# Patient Record
Sex: MALE | Race: BLACK OR AFRICAN AMERICAN | NOT HISPANIC OR LATINO | Employment: UNEMPLOYED | URBAN - METROPOLITAN AREA
[De-identification: names, ages, dates, MRNs, and addresses within clinical notes are randomized per-mention and may not be internally consistent; named-entity substitution may affect disease eponyms.]

---

## 2022-11-16 DIAGNOSIS — M71.22 BAKER'S CYST, LEFT: Primary | ICD-10-CM

## 2022-11-17 ENCOUNTER — HOSPITAL ENCOUNTER (OUTPATIENT)
Dept: RADIOLOGY | Facility: HOSPITAL | Age: 57
Discharge: HOME | End: 2022-11-17
Attending: STUDENT IN AN ORGANIZED HEALTH CARE EDUCATION/TRAINING PROGRAM
Payer: MEDICAID

## 2022-11-17 ENCOUNTER — OFFICE VISIT (OUTPATIENT)
Dept: SPORTS MEDICINE | Facility: CLINIC | Age: 57
End: 2022-11-17
Payer: MEDICAID

## 2022-11-17 VITALS — HEIGHT: 71 IN | RESPIRATION RATE: 20 BRPM

## 2022-11-17 DIAGNOSIS — M25.462 EFFUSION OF LEFT KNEE JOINT: ICD-10-CM

## 2022-11-17 DIAGNOSIS — M71.22 BAKER'S CYST, LEFT: ICD-10-CM

## 2022-11-17 DIAGNOSIS — S83.412A TEAR OF MCL (MEDIAL COLLATERAL LIGAMENT) OF KNEE, LEFT, INITIAL ENCOUNTER: Primary | ICD-10-CM

## 2022-11-17 PROCEDURE — 99204 OFFICE O/P NEW MOD 45 MIN: CPT | Mod: 25,S$PBB,, | Performed by: STUDENT IN AN ORGANIZED HEALTH CARE EDUCATION/TRAINING PROGRAM

## 2022-11-17 PROCEDURE — 73590 X-RAY EXAM OF LOWER LEG: CPT | Mod: TC,LT

## 2022-11-17 PROCEDURE — 1159F MED LIST DOCD IN RCRD: CPT | Mod: CPTII,,, | Performed by: STUDENT IN AN ORGANIZED HEALTH CARE EDUCATION/TRAINING PROGRAM

## 2022-11-17 PROCEDURE — 73590 X-RAY EXAM OF LOWER LEG: CPT | Mod: 26,LT,, | Performed by: RADIOLOGY

## 2022-11-17 PROCEDURE — 20610 DRAIN/INJ JOINT/BURSA W/O US: CPT | Mod: PBBFAC | Performed by: STUDENT IN AN ORGANIZED HEALTH CARE EDUCATION/TRAINING PROGRAM

## 2022-11-17 PROCEDURE — 73562 XR KNEE ORTHO LEFT WITH FLEXION: ICD-10-PCS | Mod: 26,RT,, | Performed by: RADIOLOGY

## 2022-11-17 PROCEDURE — 99213 OFFICE O/P EST LOW 20 MIN: CPT | Mod: PBBFAC | Performed by: STUDENT IN AN ORGANIZED HEALTH CARE EDUCATION/TRAINING PROGRAM

## 2022-11-17 PROCEDURE — 97110 THERAPEUTIC EXERCISES: CPT | Mod: PBBFAC

## 2022-11-17 PROCEDURE — 99999 PR PBB SHADOW E&M-EST. PATIENT-LVL III: CPT | Mod: PBBFAC,,, | Performed by: STUDENT IN AN ORGANIZED HEALTH CARE EDUCATION/TRAINING PROGRAM

## 2022-11-17 PROCEDURE — 20610 LARGE JOINT ASPIRATION/INJECTION: L KNEE: ICD-10-PCS | Mod: S$PBB,LT,, | Performed by: STUDENT IN AN ORGANIZED HEALTH CARE EDUCATION/TRAINING PROGRAM

## 2022-11-17 PROCEDURE — 73562 X-RAY EXAM OF KNEE 3: CPT | Mod: 26,RT,, | Performed by: RADIOLOGY

## 2022-11-17 PROCEDURE — 99999 PR PBB SHADOW E&M-EST. PATIENT-LVL III: ICD-10-PCS | Mod: PBBFAC,,, | Performed by: STUDENT IN AN ORGANIZED HEALTH CARE EDUCATION/TRAINING PROGRAM

## 2022-11-17 PROCEDURE — 1159F PR MEDICATION LIST DOCUMENTED IN MEDICAL RECORD: ICD-10-PCS | Mod: CPTII,,, | Performed by: STUDENT IN AN ORGANIZED HEALTH CARE EDUCATION/TRAINING PROGRAM

## 2022-11-17 PROCEDURE — 97760 ORTHOTIC MGMT&TRAING 1ST ENC: CPT | Mod: GP,PBBFAC

## 2022-11-17 PROCEDURE — 99204 PR OFFICE/OUTPT VISIT, NEW, LEVL IV, 45-59 MIN: ICD-10-PCS | Mod: 25,S$PBB,, | Performed by: STUDENT IN AN ORGANIZED HEALTH CARE EDUCATION/TRAINING PROGRAM

## 2022-11-17 PROCEDURE — 73564 X-RAY EXAM KNEE 4 OR MORE: CPT | Mod: 26,LT,, | Performed by: RADIOLOGY

## 2022-11-17 PROCEDURE — 73564 X-RAY EXAM KNEE 4 OR MORE: CPT | Mod: TC,LT

## 2022-11-17 PROCEDURE — 73590 XR TIBIA FIBULA 2 VIEW LEFT: ICD-10-PCS | Mod: 26,LT,, | Performed by: RADIOLOGY

## 2022-11-17 PROCEDURE — 73564 XR KNEE ORTHO LEFT WITH FLEXION: ICD-10-PCS | Mod: 26,LT,, | Performed by: RADIOLOGY

## 2022-11-17 RX ORDER — MELOXICAM 15 MG/1
15 TABLET ORAL DAILY
Qty: 30 TABLET | Refills: 1 | Status: SHIPPED | OUTPATIENT
Start: 2022-11-17

## 2022-11-17 RX ORDER — LIDOCAINE HYDROCHLORIDE 10 MG/ML
5 INJECTION INFILTRATION; PERINEURAL
Status: DISCONTINUED | OUTPATIENT
Start: 2022-11-17 | End: 2022-11-17 | Stop reason: HOSPADM

## 2022-11-17 RX ADMIN — LIDOCAINE HYDROCHLORIDE 5 ML: 10 INJECTION, SOLUTION INFILTRATION; PERINEURAL at 08:11

## 2022-11-17 NOTE — PROCEDURES
Large Joint Aspiration/Injection: L knee    Date/Time: 11/17/2022 8:20 AM  Performed by: Mike Coker MD  Authorized by: Mike Coker MD     Consent Done?:  Yes (Verbal)  Indications:  Pain and joint swelling  Site marked: the procedure site was marked    Timeout: prior to procedure the correct patient, procedure, and site was verified    Prep: patient was prepped and draped in usual sterile fashion      Local anesthesia used?: Yes    Local anesthetic:  Topical anesthetic    Details:  Needle Size:  22 G  Ultrasonic Guidance for needle placement?: No    Approach: Superior lateral.  Location:  Knee  Site:  L knee  Medications:  5 mL LIDOcaine HCL 10 mg/ml (1%) 10 mg/mL (1 %)  Aspirate amount (mL):  76  Aspirate:  Serous  Patient tolerance:  Patient tolerated the procedure well with no immediate complications     We discussed the proper protocols after the injection such as no submerging pools, baths tubs, or hot tubs for 24 hr.  Showering is okay today.  We also discussed that blood sugars can be elevated after an injection and asked patient to properly checked her sugars over the next few days and contact their PCP if there are any concerns.  We discussed red flags such as fevers, chills, red, warm, tender joint at the area of injection to please seek medical care immediately.

## 2022-11-17 NOTE — PATIENT INSTRUCTIONS
Assessment:  Michi Diaz is a 57 y.o. male   Chief Complaint   Patient presents with    Left Knee - Pain, Swelling    Cyst     Bakers        Encounter Diagnoses   Name Primary?    Tear of MCL (medial collateral ligament) of knee, left, initial encounter Yes    Effusion of left knee joint         Plan:  Reviewed your x-rays with you today and discussed pertinent findings.   Aspirated left knee today. We discussed the proper protocols after the injection such as no submerging pools, baths tubs, or hot tubs for 24 hr.  Showering is okay today.  We also discussed that blood sugars can be elevated after an injection and asked patient to properly checked her sugars over the next few days and contact their PCP if there are any concerns.  We discussed red flags such as fevers, chills, red, warm, tender joint at the area of injection to please seek medical care immediately.    Fitted for knee hinge brace. At least 15 minutes were spent sizing, fitting, and educating regarding durable medical equipment today and all questions answered. This service was performed by  [facility] under direction of Mike Coker MD today.  CPT 26517.  Provided knee compression sleeve. Please wear this for the next week.  Placed a referral for an MRI within the system today. We will try follow-up in clinic 24-48 hours after these images are obtained.   You were prescribed meloxicam today as an anti-inflammatory medicine for the pain you are having.  Please take this medicine once a day with food for 2 weeks, and then as needed for days with significant recurrent pain.  Please do not take any other anti-inflammatories such as naproxen, ibuprofen, Aleve at the same time you are taking this medicine.             Follow-up: After MRI or sooner if there are any problems between now and then.    Thank you for choosing [facility] and Dr. Mike Coker for your orthopedic & sports medicine care. It is our  goal to provide you with exceptional care that will help keep you healthy, active, and get you back in the game.    Please do not hesitate to reach out to us via email, phone, or MyChart with any questions, concerns, or feedback.    If you felt that you received exemplary care today, please consider leaving us feedback on [employer] at:  [email or web address]    If you are experiencing pain/discomfort ,or have questions after 5pm and would like to be connected to the [facility] on-call team, please call this number and specify which Sports Medicine provider is treating you: [phone]

## 2022-11-17 NOTE — PROGRESS NOTES
Patient ID: Michi Diaz  YOB: 1965  MRN: [id number]    Chief Complaint: Pain and Swelling of the Left Knee and Cyst (Bakers )    Referred By: self for Left knee pain, swelling, and injury (10/25/2022)    History of Present Illness: Michi Diaz is a 57 y.o. male who presents today with Left knee pain and swelling from syncopal fall on 10/25/2022.    The patient is active in none.  Occupation: Disabled    Michi Diaz states this is Acute in nature and there was a specific mechanism. He passed out at his house and fell. He does not recall if he twisted his knee or fell directly on his knee during the fall. This began 10/25/2022 and Michi Diaz describes the pain as a continuous throbbing, sharp pain. Treatment to date includes use of crutches and rest. They believe that they are unchanged with this treatment. Current pain level at rest is 8/10, pain level at worst is 8/10 and pain level at best is 6/10 (Numeric Pain Rating Scale).  Associated symptoms include: Swelling Yes, Instability Yes, Pain that affects your sleep Yes, locking/catching No, Neurological No, limited range of motion Yes.    Aggravating activities include walking and movement of his knee.   Alleviating activities include rest and use of crutches.     They denies formal physical therapy for this. 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    Past Medical History:   History reviewed. No pertinent past medical history.  History reviewed. No pertinent surgical history.  Family History   Problem Relation Age of Onset    No Known Problems Mother     No Known Problems Father      Social History     Socioeconomic History    Marital status: Single   Tobacco Use    Smoking status: Never     Passive exposure: Never    Smokeless tobacco: Never   Substance and Sexual Activity    Alcohol use: Yes    Drug use: Never    Sexual activity: Yes      Partners: Female       Review of patient's allergies indicates:  No Known Allergies    Physical Exam:   There is no height or weight on file to calculate BMI.    GENERAL: Well appearing, in no acute distress.  HEAD: Normocephalic and atraumatic.  ENT: External ears and nose grossly normal.  EYES: EOMI bilaterally  PULMONARY: Respirations are grossly even and non-labored.  NEURO: Awake, alert, and oriented x 3.  SKIN: No obvious rashes appreciated.  PSYCH: Mood & affect are appropriate.    Detailed MSK exam:     Large effusion decreased flexion secondary to pain and effusion   Decreased patellar mobility in all quadrants some pain with patellar compression 1B Lachman's positive valgus stress at 0 and 30° with some instability negative varus stress, equivocal anterior drawer negative posterior drawer tenderness over the medial joint line positive Ti's medially point tenderness over the proximal MCL    Imaging:  X-Ray Tibia Fibula 2 View Left  Narrative: EXAMINATION:  XR TIBIA FIBULA 2 VIEW LEFT    CLINICAL HISTORY:  Synovial cyst of popliteal space (Baker), left knee    TECHNIQUE:  AP and lateral views of the left tibia and fibula were performed.    COMPARISON:  None.    FINDINGS:  No acute fracture or malalignment is noted of the left tibia or fibula.  Tricompartmental degenerative changes of the left knee with mild osteoarthritic changes of the ankle and midfoot.  Soft tissue swelling posterior to the knee concerning for popliteal cyst.  Impression: As above    Electronically signed by: Shaun Torres  Date:    11/17/2022  Time:    08:18  X-ray Knee Ortho Left with Flexion  Narrative: EXAMINATION:  XR KNEE ORTHO LEFT WITH FLEXION    CLINICAL HISTORY:  . Synovial cyst of popliteal space (Baker), left knee    TECHNIQUE:  AP standing view of both knees, PA flexion standing views of both knees, and Merchant views of both knees were performed. A lateral view of the left knee was also  performed.    COMPARISON:  None    FINDINGS:  Tricompartmental osteoarthritis is present with bilateral knees, moderate in severity, left greater than right.  Large left joint effusion is present.  No evidence of acute fracture or dislocation.  No patellar tilt or subluxation.  Impression: As above    Electronically signed by: Shaun Torres  Date:    11/17/2022  Time:    08:15      Relevant imaging results were reviewed and interpreted by me and per my read as above.  This was discussed with the patient and / or family today.     Assessment:  Michi Diaz is a 57 y.o. male presents today for left knee pain and swelling after syncopal event with a large effusion instability noted today over the MCL.  Discussed concern for possible intra-articular pathology with his large effusion today as well as some instability and pain with valgus stress and a grade 2 MCL sprain.  Discussed aspiration and hinged knee brace for stability crutches as needed weight-bearing and oral anti-inflammatories and an MRI for further evaluation.  Follow-up after MRI left knee.    Tear of MCL (medial collateral ligament) of knee, left, initial encounter  -     MRI Knee Without Contrast Left; Future; Expected date: 11/17/2022    Effusion of left knee joint  -     MRI Knee Without Contrast Left; Future; Expected date: 11/17/2022  -     Large Joint Aspiration/Injection: L knee     At least 10 minutes were spent sizing, fitting, and educating for durable medical equipment application today.  CPT 98435.    At least 10 minutes were spent teaching the patient a therapeutic home exercise program and they were provided this plan in writing.  CPT 03313    A copy of today's visit note has been sent to the referring provider.       Mike Coker MD    Disclaimer: This note was prepared using a voice recognition system and is likely to have sound alike errors within the text.

## 2022-11-29 ENCOUNTER — HOSPITAL ENCOUNTER (OUTPATIENT)
Dept: RADIOLOGY | Facility: HOSPITAL | Age: 57
Discharge: HOME | End: 2022-11-29
Attending: STUDENT IN AN ORGANIZED HEALTH CARE EDUCATION/TRAINING PROGRAM
Payer: MEDICAID

## 2022-11-29 DIAGNOSIS — S83.412A TEAR OF MCL (MEDIAL COLLATERAL LIGAMENT) OF KNEE, LEFT, INITIAL ENCOUNTER: ICD-10-CM

## 2022-11-29 DIAGNOSIS — M25.462 EFFUSION OF LEFT KNEE JOINT: ICD-10-CM

## 2022-11-29 PROCEDURE — 73721 MRI JNT OF LWR EXTRE W/O DYE: CPT | Mod: TC,LT

## 2022-12-07 ENCOUNTER — OFFICE VISIT (OUTPATIENT)
Dept: SPORTS MEDICINE | Facility: CLINIC | Age: 57
End: 2022-12-07
Payer: MEDICAID

## 2022-12-07 VITALS — HEIGHT: 71 IN | WEIGHT: 24.69 LBS | RESPIRATION RATE: 20 BRPM | BODY MASS INDEX: 3.46 KG/M2

## 2022-12-07 DIAGNOSIS — M25.462 EFFUSION OF LEFT KNEE: ICD-10-CM

## 2022-12-07 DIAGNOSIS — M17.12 PRIMARY OSTEOARTHRITIS OF LEFT KNEE: Primary | ICD-10-CM

## 2022-12-07 DIAGNOSIS — S83.412D SPRAIN OF MEDIAL COLLATERAL LIGAMENT OF LEFT KNEE, SUBSEQUENT ENCOUNTER: ICD-10-CM

## 2022-12-07 PROCEDURE — 1159F MED LIST DOCD IN RCRD: CPT | Mod: CPTII,,, | Performed by: STUDENT IN AN ORGANIZED HEALTH CARE EDUCATION/TRAINING PROGRAM

## 2022-12-07 PROCEDURE — 99213 OFFICE O/P EST LOW 20 MIN: CPT | Mod: PBBFAC,25 | Performed by: STUDENT IN AN ORGANIZED HEALTH CARE EDUCATION/TRAINING PROGRAM

## 2022-12-07 PROCEDURE — 99214 PR OFFICE/OUTPT VISIT, EST, LEVL IV, 30-39 MIN: ICD-10-PCS | Mod: 25,S$PBB,, | Performed by: STUDENT IN AN ORGANIZED HEALTH CARE EDUCATION/TRAINING PROGRAM

## 2022-12-07 PROCEDURE — 20611 LARGE JOINT ASPIRATION/INJECTION: L KNEE: ICD-10-PCS | Mod: S$PBB,LT,, | Performed by: STUDENT IN AN ORGANIZED HEALTH CARE EDUCATION/TRAINING PROGRAM

## 2022-12-07 PROCEDURE — 1159F PR MEDICATION LIST DOCUMENTED IN MEDICAL RECORD: ICD-10-PCS | Mod: CPTII,,, | Performed by: STUDENT IN AN ORGANIZED HEALTH CARE EDUCATION/TRAINING PROGRAM

## 2022-12-07 PROCEDURE — 3008F PR BODY MASS INDEX (BMI) DOCUMENTED: ICD-10-PCS | Mod: CPTII,,, | Performed by: STUDENT IN AN ORGANIZED HEALTH CARE EDUCATION/TRAINING PROGRAM

## 2022-12-07 PROCEDURE — 99214 OFFICE O/P EST MOD 30 MIN: CPT | Mod: 25,S$PBB,, | Performed by: STUDENT IN AN ORGANIZED HEALTH CARE EDUCATION/TRAINING PROGRAM

## 2022-12-07 PROCEDURE — 3008F BODY MASS INDEX DOCD: CPT | Mod: CPTII,,, | Performed by: STUDENT IN AN ORGANIZED HEALTH CARE EDUCATION/TRAINING PROGRAM

## 2022-12-07 PROCEDURE — 20611 DRAIN/INJ JOINT/BURSA W/US: CPT | Mod: PBBFAC,LT | Performed by: STUDENT IN AN ORGANIZED HEALTH CARE EDUCATION/TRAINING PROGRAM

## 2022-12-07 PROCEDURE — 99999 PR PBB SHADOW E&M-EST. PATIENT-LVL III: ICD-10-PCS | Mod: PBBFAC,,, | Performed by: STUDENT IN AN ORGANIZED HEALTH CARE EDUCATION/TRAINING PROGRAM

## 2022-12-07 PROCEDURE — 99999 PR PBB SHADOW E&M-EST. PATIENT-LVL III: CPT | Mod: PBBFAC,,, | Performed by: STUDENT IN AN ORGANIZED HEALTH CARE EDUCATION/TRAINING PROGRAM

## 2022-12-07 RX ORDER — TRIAMCINOLONE ACETONIDE 40 MG/ML
40 INJECTION, SUSPENSION INTRA-ARTICULAR; INTRAMUSCULAR
Status: DISCONTINUED | OUTPATIENT
Start: 2022-12-07 | End: 2022-12-07 | Stop reason: HOSPADM

## 2022-12-07 RX ADMIN — TRIAMCINOLONE ACETONIDE 40 MG: 40 INJECTION, SUSPENSION INTRA-ARTICULAR; INTRAMUSCULAR at 09:12

## 2022-12-07 NOTE — PROGRESS NOTES
Patient ID: Michi Diaz  YOB: 1965  MRN: [id number]    Chief Complaint: Follow-up (Tear of MCL (medial collateral ligament) of knee, left discuss MRI findings )      History of Present Illness: Michi Diaz is a right-hand dominant 57 y.o. male who presents today with 7/10 pain Follow-up Tear of MCL (medial collateral ligament) of knee, left discuss MRI findings.     The patient is active in none.  Occupation: Unemployed     ***    Past Medical History:   History reviewed. No pertinent past medical history.  History reviewed. No pertinent surgical history.  Family History   Problem Relation Age of Onset    No Known Problems Mother     No Known Problems Father      Social History     Socioeconomic History    Marital status: Single   Tobacco Use    Smoking status: Never     Passive exposure: Never    Smokeless tobacco: Never   Substance and Sexual Activity    Alcohol use: Yes    Drug use: Never    Sexual activity: Yes     Partners: Female     Medication List with Changes/Refills   Current Medications    MELOXICAM (MOBIC) 15 MG TABLET    Take 1 tablet (15 mg total) by mouth once daily.     Review of patient's allergies indicates:  No Known Allergies    Physical Exam:   Body mass index is 3.44 kg/m².    GENERAL: Well appearing, in no acute distress.  HEAD: Normocephalic and atraumatic.  ENT: External ears and nose grossly normal.  EYES: EOMI bilaterally  PULMONARY: Respirations are grossly even and non-labored.  NEURO: Awake, alert, and oriented x 3.  SKIN: No obvious rashes appreciated.  PSYCH: Mood & affect are appropriate.    Detailed MSK exam:     ***    Imaging:  MRI Knee Without Contrast Left  Narrative: EXAMINATION:  MRI KNEE WITHOUT CONTRAST LEFT    CLINICAL HISTORY:  Knee trauma, internal derangement suspected, xray done;Sprain of medial collateral ligament of left knee, initial encounter    TECHNIQUE:  MR left knee performed multiplanar T1, T2, and proton density weighted  sequences.    COMPARISON:  Left knee x-ray 11/17/2022.    FINDINGS:  There is motion artifact on the axial sequence.    There is a very large knee joint effusion and Baker's cyst with circumferential subcutaneous/pericapsular edema.  The extensor mechanism is intact.    There is a grade 2 MCL sprain with partial-thickness tearing proximal aspect with prominent interstitial fluid collection.  There is no full-thickness MCL disruption.  There is concomitant subcortical bone marrow edema underlying medial femoral condyle.  Moderate pericapsular edema.    Menisci, lateral collateral ligament complex, and cruciate ligaments maintain normal signal intensity morphology.    There is generalized tricompartmental chondral thinning most accentuated involving the central weight-bearing lateral femoral condyle with near full-thickness loss.  There is minimal spurring.    Low-grade patchy edema like signal some central/lateral tibial plateau.  Impression: 1. Grade 2 MCL sprain partial-thickness tearing proximal aspect with prominent interstitial fluid collection, moderate pericapsular edema, and subcortical bone marrow edema medial femoral condyle.  2. Very large knee joint effusion and Baker's cyst.  Circumferential subcutaneous/pericapsular edema.  3. Generalized tricompartmental chondral thinning, most pronounced central weight-bearing lateral femoral condyle with near full-thickness loss.  Minimal spurring.    Electronically signed by: James Ardon MD  Date:    11/30/2022  Time:    08:42    ***    Relevant imaging results were reviewed and interpreted by me and per my read ***.  This was discussed with the patient and / or family today.     Assessment:  Michi Diaz is a 57 y.o. male ***    There are no diagnoses linked to this encounter.       Mike Coker MD    Disclaimer: This note was prepared using a voice recognition system and is likely to have sound alike errors within the text.

## 2022-12-07 NOTE — PATIENT INSTRUCTIONS
Assessment:  Michi Diaz is a 57 y.o. male   Chief Complaint   Patient presents with    Follow-up     Tear of MCL (medial collateral ligament) of knee, left discuss MRI findings        Encounter Diagnosis   Name Primary?    Sprain of medial collateral ligament of left knee, subsequent encounter Yes        Plan:  Reviewed your MRI with you today and discussed pertinent findings.   There is a grade 2 MCL sprain of the left knee. This should scar down and heal with bracing, rest and time.   Please wear brace for walking long distances for next 4-6 weeks  Aspirated left knee and provided corticosteroid injection to left knee. We discussed the proper protocols after the injection such as no submerging pools, baths tubs, or hot tubs for 24 hr.  Showering is okay today.  We also discussed that blood sugars can be elevated after an injection and asked patient to properly checked her sugars over the next few days and contact their PCP if there are any concerns.  We discussed red flags such as fevers, chills, red, warm, tender joint at the area of injection to please seek medical care immediately.    Please reach out to us through [employer] messages if you have any questions or concerns. We will gladly answer you in a timely manner.     Follow-up: As needed or sooner if there are any problems between now and then.    Thank you for choosing [facility] and Dr. Mike Coker for your orthopedic & sports medicine care. It is our goal to provide you with exceptional care that will help keep you healthy, active, and get you back in the game.    Please do not hesitate to reach out to us via email, phone, or [employer] with any questions, concerns, or feedback.    If you felt that you received exemplary care today, please consider leaving us feedback on Healthgrades at:  [email or web address]    If you are experiencing pain/discomfort ,or have questions after 5pm and would  like to be connected to the [facility] on-call team, please call this number and specify which Sports Medicine provider is treating you: [phone]

## 2022-12-07 NOTE — PROGRESS NOTES
Patient ID: Michi Diaz  YOB: 1965  MRN: [id number]    Chief Complaint: Follow-up (Tear of MCL (medial collateral ligament) of knee, left discuss MRI findings )    History of Present Illness: Michi Diaz is a right-hand dominant 57 y.o. male who presents today with 7/10 pain Follow-up Tear of MCL (medial collateral ligament) of knee, left discuss MRI findings.     The patient is active in none.  Occupation: Unemployed     Last Appointment: 11/17/2022   Diagnosis: MCL sprain   Prior Procedure: Aspiration, MRI, hinged knee brace for stability crutches as needed weight-bearing and oral anti-inflammatories    Michi Diaz is a 57 y.o. male who is here for f/u evaluation of left knee. The patient was last seen here for left knee following an acute injury at which point we decided to move forward with aspiration and MRI prior to considering further treatment options. To review his history, he had an acute mechanism following syncopal evenr with valgus moment of left knee (10/25/2022). He is here for MRI follow up.     Past Medical History:   History reviewed. No pertinent past medical history.  History reviewed. No pertinent surgical history.  Family History   Problem Relation Age of Onset    No Known Problems Mother     No Known Problems Father      Social History     Socioeconomic History    Marital status: Single   Tobacco Use    Smoking status: Never     Passive exposure: Never    Smokeless tobacco: Never   Substance and Sexual Activity    Alcohol use: Yes    Drug use: Never    Sexual activity: Yes     Partners: Female     Medication List with Changes/Refills   Current Medications    MELOXICAM (MOBIC) 15 MG TABLET    Take 1 tablet (15 mg total) by mouth once daily.     Review of patient's allergies indicates:  No Known Allergies    Physical Exam:   Body mass index is 3.44 kg/m².    GENERAL: Well appearing, in no acute distress.  HEAD: Normocephalic and atraumatic.  ENT: External ears and  nose grossly normal.  EYES: EOMI bilaterally  PULMONARY: Respirations are grossly even and non-labored.  NEURO: Awake, alert, and oriented x 3.  SKIN: No obvious rashes appreciated.  PSYCH: Mood & affect are appropriate.    Detailed MSK exam:     Full range motion large effusion appreciated no pain with patellar compression no redness or warmth appreciated negative Lachman's pain with some valgus stress mostly at 30° no instability appreciated negative varus negative anterior negative posterior drawer no tenderness over the medial joint line some tenderness over the proximal MCL no tenderness over lateral joint line fullness appreciated in the posterior medial popliteal fossa consistent with a Baker cyst.    Imaging:  MRI Knee Without Contrast Left  Narrative: EXAMINATION:  MRI KNEE WITHOUT CONTRAST LEFT    CLINICAL HISTORY:  Knee trauma, internal derangement suspected, xray done;Sprain of medial collateral ligament of left knee, initial encounter    TECHNIQUE:  MR left knee performed multiplanar T1, T2, and proton density weighted sequences.    COMPARISON:  Left knee x-ray 11/17/2022.    FINDINGS:  There is motion artifact on the axial sequence.    There is a very large knee joint effusion and Baker's cyst with circumferential subcutaneous/pericapsular edema.  The extensor mechanism is intact.    There is a grade 2 MCL sprain with partial-thickness tearing proximal aspect with prominent interstitial fluid collection.  There is no full-thickness MCL disruption.  There is concomitant subcortical bone marrow edema underlying medial femoral condyle.  Moderate pericapsular edema.    Menisci, lateral collateral ligament complex, and cruciate ligaments maintain normal signal intensity morphology.    There is generalized tricompartmental chondral thinning most accentuated involving the central weight-bearing lateral femoral condyle with near full-thickness loss.  There is minimal spurring.    Low-grade patchy edema like  signal some central/lateral tibial plateau.  Impression: 1. Grade 2 MCL sprain partial-thickness tearing proximal aspect with prominent interstitial fluid collection, moderate pericapsular edema, and subcortical bone marrow edema medial femoral condyle.  2. Very large knee joint effusion and Baker's cyst.  Circumferential subcutaneous/pericapsular edema.  3. Generalized tricompartmental chondral thinning, most pronounced central weight-bearing lateral femoral condyle with near full-thickness loss.  Minimal spurring.    Electronically signed by: James Ardon MD  Date:    11/30/2022  Time:    08:42    Relevant imaging results were reviewed and interpreted by me and per my read as above.  This was discussed with the patient and / or family today.     Assessment:  Michi Diaz is a 57 y.o. male presents today for follow-up MRI left knee consistent with a grade 2 MCL sprain more than 6 weeks out from initial injury and feels quite stable today on exam discussed hinged knee brace as needed for longer activities not currently wearing today and feels stable.  Discussed aspiration and injection today with corticosteroid as he is continued having some pain throughout the knee joint itself.  Please refer to procedure note for the details.  Compression sleeve given today follow-up with me as needed in the future.    Primary osteoarthritis of left knee  -     Large Joint Aspiration/Injection: L knee    Sprain of medial collateral ligament of left knee, subsequent encounter    Effusion of left knee  -     Large Joint Aspiration/Injection: L knee         Mike Coker MD    Disclaimer: This note was prepared using a voice recognition system and is likely to have sound alike errors within the text.

## 2022-12-07 NOTE — PROCEDURES
Large Joint Aspiration/Injection: L knee    Date/Time: 12/7/2022 9:20 AM  Performed by: Mike Coker MD  Authorized by: Mike Coker MD     Consent Done?:  Yes (Verbal)  Indications:  Pain and joint swelling  Site marked: the procedure site was marked    Timeout: prior to procedure the correct patient, procedure, and site was verified    Prep: patient was prepped and draped in usual sterile fashion      Local anesthesia used?: Yes    Local anesthetic:  Topical anesthetic    Details:  Needle Size:  18 G  Ultrasonic Guidance for needle placement?: Yes    Images are saved and documented.  Approach: Superior lateral.  Location:  Knee  Site:  L knee  Medications:  40 mg triamcinolone acetonide 40 mg/mL  Aspirate amount (mL):  50  Aspirate:  Serous  Patient tolerance:  Patient tolerated the procedure well with no immediate complications     We discussed the proper protocols after the injection such as no submerging pools, baths tubs, or hot tubs for 24 hr.  Showering is okay today.  We also discussed that blood sugars can be elevated after an injection and asked patient to properly checked her sugars over the next few days and contact their PCP if there are any concerns.  We discussed red flags such as fevers, chills, red, warm, tender joint at the area of injection to please seek medical care immediately.